# Patient Record
Sex: FEMALE | Race: BLACK OR AFRICAN AMERICAN | Employment: UNEMPLOYED | URBAN - METROPOLITAN AREA
[De-identification: names, ages, dates, MRNs, and addresses within clinical notes are randomized per-mention and may not be internally consistent; named-entity substitution may affect disease eponyms.]

---

## 2019-04-25 ENCOUNTER — HOSPITAL ENCOUNTER (EMERGENCY)
Age: 34
Discharge: HOME | End: 2019-04-25
Payer: MEDICAID

## 2019-04-25 ENCOUNTER — APPOINTMENT (OUTPATIENT)
Dept: GENERAL RADIOLOGY | Age: 34
End: 2019-04-25
Payer: MEDICAID

## 2019-04-25 ENCOUNTER — APPOINTMENT (OUTPATIENT)
Dept: CT IMAGING | Age: 34
End: 2019-04-25
Payer: MEDICAID

## 2019-04-25 VITALS
RESPIRATION RATE: 18 BRPM | TEMPERATURE: 97.6 F | HEART RATE: 92 BPM | WEIGHT: 150 LBS | SYSTOLIC BLOOD PRESSURE: 131 MMHG | HEIGHT: 66 IN | OXYGEN SATURATION: 100 % | BODY MASS INDEX: 24.11 KG/M2 | DIASTOLIC BLOOD PRESSURE: 79 MMHG
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
## 2019-04-25 DIAGNOSIS — R51.9 NONINTRACTABLE HEADACHE, UNSPECIFIED CHRONICITY PATTERN, UNSPECIFIED HEADACHE TYPE: ICD-10-CM

## 2019-04-25 LAB
HCG, URINE, POC: NEGATIVE
Lab: NORMAL
NEGATIVE QC PASS/FAIL: NORMAL
POSITIVE QC PASS/FAIL: NORMAL

## 2019-04-25 PROCEDURE — 99284 EMERGENCY DEPT VISIT MOD MDM: CPT

## 2019-04-25 PROCEDURE — 72125 CT NECK SPINE W/O DYE: CPT

## 2019-04-25 PROCEDURE — 6370000000 HC RX 637 (ALT 250 FOR IP): Performed by: NURSE PRACTITIONER

## 2019-04-25 PROCEDURE — 73130 X-RAY EXAM OF HAND: CPT

## 2019-04-25 PROCEDURE — 70450 CT HEAD/BRAIN W/O DYE: CPT

## 2019-04-25 RX ORDER — IBUPROFEN 800 MG/1
800 TABLET ORAL ONCE
Status: COMPLETED | OUTPATIENT
Start: 2019-04-25 | End: 2019-04-25

## 2019-04-25 RX ORDER — IBUPROFEN 800 MG/1
800 TABLET ORAL EVERY 8 HOURS PRN
Qty: 30 TABLET | Refills: 0 | Status: SHIPPED | OUTPATIENT
Start: 2019-04-25 | End: 2021-04-29

## 2019-04-25 RX ADMIN — IBUPROFEN 800 MG: 800 TABLET, FILM COATED ORAL at 19:25

## 2019-04-25 ASSESSMENT — PAIN DESCRIPTION - LOCATION
LOCATION: HEAD
LOCATION: HEAD

## 2019-04-25 ASSESSMENT — PAIN SCALES - GENERAL
PAINLEVEL_OUTOF10: 9
PAINLEVEL_OUTOF10: 6
PAINLEVEL_OUTOF10: 6

## 2019-04-25 ASSESSMENT — PAIN DESCRIPTION - ORIENTATION: ORIENTATION: RIGHT;LEFT

## 2019-04-25 ASSESSMENT — PAIN DESCRIPTION - DESCRIPTORS
DESCRIPTORS: HEADACHE
DESCRIPTORS: HEADACHE;ACHING

## 2019-04-25 ASSESSMENT — PAIN DESCRIPTION - FREQUENCY: FREQUENCY: CONTINUOUS

## 2019-04-25 ASSESSMENT — PAIN DESCRIPTION - PAIN TYPE
TYPE: ACUTE PAIN
TYPE: ACUTE PAIN

## 2019-04-25 NOTE — ED NOTES
Patient given cup and instructed on needing a urine sample before scans. Patient states she understands.      Charles Clement RN  04/25/19 1931

## 2019-04-25 NOTE — ED NOTES
Bed: 38  Expected date:   Expected time:   Means of arrival:   Comments:  ems     Mary Jane Velazquez RN  04/25/19 [id number]
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
## 2019-11-15 ENCOUNTER — HOSPITAL ENCOUNTER (EMERGENCY)
Age: 34
Discharge: HOME | End: 2019-11-15
Attending: EMERGENCY MEDICINE
Payer: MEDICAID

## 2019-11-15 ENCOUNTER — APPOINTMENT (OUTPATIENT)
Dept: GENERAL RADIOLOGY | Age: 34
End: 2019-11-15
Payer: MEDICAID

## 2019-11-15 VITALS
RESPIRATION RATE: 18 BRPM | BODY MASS INDEX: 25.71 KG/M2 | HEART RATE: 81 BPM | DIASTOLIC BLOOD PRESSURE: 71 MMHG | TEMPERATURE: 98.4 F | WEIGHT: 160 LBS | HEIGHT: 66 IN | SYSTOLIC BLOOD PRESSURE: 106 MMHG | OXYGEN SATURATION: 100 %

## 2019-11-15 DIAGNOSIS — L02.91 ABSCESS: Primary | ICD-10-CM

## 2019-11-15 DIAGNOSIS — M54.9 OTHER ACUTE BACK PAIN: ICD-10-CM

## 2019-11-15 DIAGNOSIS — E11.65 POORLY CONTROLLED DIABETES MELLITUS (HCC): ICD-10-CM

## 2019-11-15 LAB
ANION GAP SERPL CALCULATED.3IONS-SCNC: 13 MMOL/L (ref 7–16)
BACTERIA: ABNORMAL /HPF
BILIRUBIN URINE: NEGATIVE
BLOOD, URINE: NEGATIVE
BUN BLDV-MCNC: 10 MG/DL (ref 6–20)
CALCIUM SERPL-MCNC: 8.8 MG/DL (ref 8.6–10.2)
CHLORIDE BLD-SCNC: 99 MMOL/L (ref 98–107)
CHP ED QC CHECK: YES
CLARITY: CLEAR
CO2: 23 MMOL/L (ref 22–29)
COLOR: YELLOW
CREAT SERPL-MCNC: 0.6 MG/DL (ref 0.5–1)
EPITHELIAL CELLS, UA: ABNORMAL /HPF
GFR AFRICAN AMERICAN: >60
GFR NON-AFRICAN AMERICAN: >60 ML/MIN/1.73
GLUCOSE BLD-MCNC: 165 MG/DL
GLUCOSE BLD-MCNC: 404 MG/DL (ref 74–99)
GLUCOSE URINE: >=1000 MG/DL
HCG(URINE) PREGNANCY TEST: NEGATIVE
HCT VFR BLD CALC: 38.9 % (ref 34–48)
HEMOGLOBIN: 13.2 G/DL (ref 11.5–15.5)
KETONES, URINE: NEGATIVE MG/DL
LEUKOCYTE ESTERASE, URINE: NEGATIVE
MCH RBC QN AUTO: 33.2 PG (ref 26–35)
MCHC RBC AUTO-ENTMCNC: 33.9 % (ref 32–34.5)
MCV RBC AUTO: 98 FL (ref 80–99.9)
METER GLUCOSE: 165 MG/DL (ref 74–99)
NITRITE, URINE: NEGATIVE
PDW BLD-RTO: 11.8 FL (ref 11.5–15)
PH UA: 7 (ref 5–9)
PLATELET # BLD: 240 E9/L (ref 130–450)
PMV BLD AUTO: 9.6 FL (ref 7–12)
POTASSIUM SERPL-SCNC: 4.2 MMOL/L (ref 3.5–5)
PROTEIN UA: NEGATIVE MG/DL
RBC # BLD: 3.97 E12/L (ref 3.5–5.5)
RBC UA: ABNORMAL /HPF (ref 0–2)
SODIUM BLD-SCNC: 135 MMOL/L (ref 132–146)
SPECIFIC GRAVITY UA: <=1.005 (ref 1–1.03)
TROPONIN: <0.01 NG/ML (ref 0–0.03)
UROBILINOGEN, URINE: 0.2 E.U./DL
WBC # BLD: 15.1 E9/L (ref 4.5–11.5)
WBC UA: ABNORMAL /HPF (ref 0–5)

## 2019-11-15 PROCEDURE — 82962 GLUCOSE BLOOD TEST: CPT

## 2019-11-15 PROCEDURE — 81025 URINE PREGNANCY TEST: CPT

## 2019-11-15 PROCEDURE — 2580000003 HC RX 258: Performed by: EMERGENCY MEDICINE

## 2019-11-15 PROCEDURE — 6370000000 HC RX 637 (ALT 250 FOR IP): Performed by: EMERGENCY MEDICINE

## 2019-11-15 PROCEDURE — 96372 THER/PROPH/DIAG INJ SC/IM: CPT

## 2019-11-15 PROCEDURE — 84484 ASSAY OF TROPONIN QUANT: CPT

## 2019-11-15 PROCEDURE — 80048 BASIC METABOLIC PNL TOTAL CA: CPT

## 2019-11-15 PROCEDURE — 6360000002 HC RX W HCPCS: Performed by: EMERGENCY MEDICINE

## 2019-11-15 PROCEDURE — 93005 ELECTROCARDIOGRAM TRACING: CPT | Performed by: PHYSICIAN ASSISTANT

## 2019-11-15 PROCEDURE — 36415 COLL VENOUS BLD VENIPUNCTURE: CPT

## 2019-11-15 PROCEDURE — 81001 URINALYSIS AUTO W/SCOPE: CPT

## 2019-11-15 PROCEDURE — 96374 THER/PROPH/DIAG INJ IV PUSH: CPT

## 2019-11-15 PROCEDURE — 71046 X-RAY EXAM CHEST 2 VIEWS: CPT

## 2019-11-15 PROCEDURE — 99283 EMERGENCY DEPT VISIT LOW MDM: CPT

## 2019-11-15 PROCEDURE — 85027 COMPLETE CBC AUTOMATED: CPT

## 2019-11-15 RX ORDER — KETOROLAC TROMETHAMINE 30 MG/ML
30 INJECTION, SOLUTION INTRAMUSCULAR; INTRAVENOUS ONCE
Status: COMPLETED | OUTPATIENT
Start: 2019-11-15 | End: 2019-11-15

## 2019-11-15 RX ORDER — ASPIRIN 81 MG/1
260 TABLET, CHEWABLE ORAL ONCE
Status: COMPLETED | OUTPATIENT
Start: 2019-11-15 | End: 2019-11-15

## 2019-11-15 RX ORDER — 0.9 % SODIUM CHLORIDE 0.9 %
500 INTRAVENOUS SOLUTION INTRAVENOUS ONCE
Status: COMPLETED | OUTPATIENT
Start: 2019-11-15 | End: 2019-11-15

## 2019-11-15 RX ORDER — CEPHALEXIN 500 MG/1
500 CAPSULE ORAL ONCE
Status: COMPLETED | OUTPATIENT
Start: 2019-11-15 | End: 2019-11-15

## 2019-11-15 RX ORDER — SULFAMETHOXAZOLE AND TRIMETHOPRIM 800; 160 MG/1; MG/1
1 TABLET ORAL ONCE
Status: COMPLETED | OUTPATIENT
Start: 2019-11-15 | End: 2019-11-15

## 2019-11-15 RX ORDER — SULFAMETHOXAZOLE AND TRIMETHOPRIM 800; 160 MG/1; MG/1
1 TABLET ORAL 2 TIMES DAILY
Qty: 20 TABLET | Refills: 0 | Status: SHIPPED | OUTPATIENT
Start: 2019-11-15 | End: 2019-11-25

## 2019-11-15 RX ORDER — CEPHALEXIN 500 MG/1
500 CAPSULE ORAL 4 TIMES DAILY
Qty: 28 CAPSULE | Refills: 0 | Status: SHIPPED | OUTPATIENT
Start: 2019-11-15 | End: 2019-11-22

## 2019-11-15 RX ADMIN — KETOROLAC TROMETHAMINE 30 MG: 30 INJECTION, SOLUTION INTRAMUSCULAR; INTRAVENOUS at 21:45

## 2019-11-15 RX ADMIN — SULFAMETHOXAZOLE AND TRIMETHOPRIM 1 TABLET: 800; 160 TABLET ORAL at 21:41

## 2019-11-15 RX ADMIN — SODIUM CHLORIDE 500 ML: 9 INJECTION, SOLUTION INTRAVENOUS at 21:44

## 2019-11-15 RX ADMIN — CEPHALEXIN 500 MG: 500 CAPSULE ORAL at 21:41

## 2019-11-15 RX ADMIN — ASPIRIN 81 MG 243 MG: 81 TABLET ORAL at 20:33

## 2019-11-15 RX ADMIN — INSULIN HUMAN 7 UNITS: 100 INJECTION, SOLUTION PARENTERAL at 21:45

## 2019-11-15 ASSESSMENT — ENCOUNTER SYMPTOMS
SINUS PRESSURE: 0
EYE PAIN: 0
VOMITING: 0
ABDOMINAL DISTENTION: 0
WHEEZING: 0
NAUSEA: 0
COUGH: 0
SHORTNESS OF BREATH: 0
EYE DISCHARGE: 0
EYE REDNESS: 0
SORE THROAT: 0
BACK PAIN: 1
DIARRHEA: 0
ABDOMINAL PAIN: 0

## 2019-11-15 ASSESSMENT — PAIN SCALES - GENERAL
PAINLEVEL_OUTOF10: 8
PAINLEVEL_OUTOF10: 8

## 2019-11-15 ASSESSMENT — PAIN DESCRIPTION - PAIN TYPE: TYPE: ACUTE PAIN

## 2019-11-15 ASSESSMENT — PAIN DESCRIPTION - LOCATION: LOCATION: BACK

## 2019-11-15 ASSESSMENT — PAIN DESCRIPTION - DESCRIPTORS: DESCRIPTORS: THROBBING

## 2019-11-15 ASSESSMENT — PAIN DESCRIPTION - FREQUENCY: FREQUENCY: CONTINUOUS

## 2019-11-16 LAB
EKG ATRIAL RATE: 76 BPM
EKG P AXIS: 51 DEGREES
EKG P-R INTERVAL: 136 MS
EKG Q-T INTERVAL: 374 MS
EKG QRS DURATION: 82 MS
EKG QTC CALCULATION (BAZETT): 420 MS
EKG R AXIS: 33 DEGREES
EKG T AXIS: 33 DEGREES
EKG VENTRICULAR RATE: 76 BPM

## 2019-11-16 PROCEDURE — 93010 ELECTROCARDIOGRAM REPORT: CPT | Performed by: INTERNAL MEDICINE

## 2021-04-29 ENCOUNTER — HOSPITAL ENCOUNTER (EMERGENCY)
Age: 36
Discharge: HOME | End: 2021-04-29
Payer: MEDICAID

## 2021-04-29 VITALS
TEMPERATURE: 96.9 F | BODY MASS INDEX: 25.71 KG/M2 | OXYGEN SATURATION: 94 % | SYSTOLIC BLOOD PRESSURE: 145 MMHG | HEIGHT: 66 IN | WEIGHT: 160 LBS | HEART RATE: 83 BPM | DIASTOLIC BLOOD PRESSURE: 84 MMHG | RESPIRATION RATE: 16 BRPM

## 2021-04-29 DIAGNOSIS — N76.4 LEFT GENITAL LABIAL ABSCESS: Primary | ICD-10-CM

## 2021-04-29 LAB
CHP ED QC CHECK: NORMAL
GLUCOSE BLD-MCNC: 201 MG/DL
HCG, URINE, POC: NEGATIVE
Lab: NORMAL
METER GLUCOSE: 201 MG/DL (ref 74–99)
NEGATIVE QC PASS/FAIL: NORMAL
POSITIVE QC PASS/FAIL: NORMAL

## 2021-04-29 PROCEDURE — 2500000003 HC RX 250 WO HCPCS: Performed by: NURSE PRACTITIONER

## 2021-04-29 PROCEDURE — 99283 EMERGENCY DEPT VISIT LOW MDM: CPT

## 2021-04-29 PROCEDURE — 82962 GLUCOSE BLOOD TEST: CPT

## 2021-04-29 PROCEDURE — 6370000000 HC RX 637 (ALT 250 FOR IP): Performed by: NURSE PRACTITIONER

## 2021-04-29 PROCEDURE — 56405 I&D VULVA/PERINEAL ABSCESS: CPT

## 2021-04-29 RX ORDER — IBUPROFEN 800 MG/1
800 TABLET ORAL EVERY 8 HOURS PRN
Qty: 30 TABLET | Refills: 0 | Status: SHIPPED | OUTPATIENT
Start: 2021-04-29

## 2021-04-29 RX ORDER — LIDOCAINE HYDROCHLORIDE AND EPINEPHRINE 10; 10 MG/ML; UG/ML
20 INJECTION, SOLUTION INFILTRATION; PERINEURAL ONCE
Status: COMPLETED | OUTPATIENT
Start: 2021-04-29 | End: 2021-04-29

## 2021-04-29 RX ORDER — OXYCODONE HYDROCHLORIDE AND ACETAMINOPHEN 5; 325 MG/1; MG/1
1 TABLET ORAL ONCE
Status: COMPLETED | OUTPATIENT
Start: 2021-04-29 | End: 2021-04-29

## 2021-04-29 RX ORDER — DOXYCYCLINE HYCLATE 100 MG
100 TABLET ORAL 2 TIMES DAILY
Qty: 14 TABLET | Refills: 0 | Status: SHIPPED | OUTPATIENT
Start: 2021-04-29 | End: 2021-05-06

## 2021-04-29 RX ADMIN — OXYCODONE AND ACETAMINOPHEN 1 TABLET: 5; 325 TABLET ORAL at 09:41

## 2021-04-29 RX ADMIN — LIDOCAINE HYDROCHLORIDE AND EPINEPHRINE 20 ML: 10; 10 INJECTION, SOLUTION INFILTRATION; PERINEURAL at 09:41

## 2021-04-29 ASSESSMENT — PAIN DESCRIPTION - PROGRESSION: CLINICAL_PROGRESSION: GRADUALLY WORSENING

## 2021-04-29 ASSESSMENT — PAIN SCALES - GENERAL
PAINLEVEL_OUTOF10: 9
PAINLEVEL_OUTOF10: 10

## 2021-04-29 ASSESSMENT — PAIN DESCRIPTION - FREQUENCY: FREQUENCY: CONTINUOUS

## 2021-04-29 ASSESSMENT — PAIN DESCRIPTION - ONSET: ONSET: PROGRESSIVE

## 2021-04-29 ASSESSMENT — PAIN DESCRIPTION - PAIN TYPE: TYPE: ACUTE PAIN

## 2021-04-29 ASSESSMENT — PAIN DESCRIPTION - LOCATION: LOCATION: LABIA

## 2021-04-29 NOTE — ED PROVIDER NOTES
lb (72.6 kg)         Constitutional:  Alert, development consistent with age. HEENT:  NC/NT. Airway patent  Neck:  Normal ROM. Supple. Respiratory:  Clear to auscultation and breath sounds equal.  CV:  Regular rate and rhythm, normal heart sounds, without pathological murmurs, ectopy, gallops, or rubs. GI:  Abdomen Soft, nontender, good bowel sounds. No firm or pulsatile mass. Back:  No costovertebral tenderness. Extremities: No tenderness or edema noted. Integument: Approximately 2 cm indurated fluctuant abscess to left inferior labial fold. No surrounding erythema warmth, no spontaneous drainage normal turgor. Warm, dry, without visible rash, unless noted elsewhere. Lymphatics: No lymphangitis or adenopathy noted. Neurological:  Oriented. Motor functions intact. Lab / Imaging Results   (All laboratory and radiology results have been personally reviewed by myself)  Labs:  Results for orders placed or performed during the hospital encounter of 04/29/21   POCT glucose   Result Value Ref Range    Glucose 201 mg/dL    QC OK? ok    POC Pregnancy Urine Qual   Result Value Ref Range    HCG, Urine, POC Negative Negative    Lot Number [id number]     Positive QC Pass/Fail Pass     Negative QC Pass/Fail Pass    POCT Glucose   Result Value Ref Range    Meter Glucose 201 (H) 74 - 99 mg/dL     Imaging: All Radiology results interpreted by Radiologist unless otherwise noted. No orders to display       ED Course / Medical Decision Making     Medications   oxyCODONE-acetaminophen (PERCOCET) 5-325 MG per tablet 1 tablet (1 tablet Oral Given 4/29/21 0941)   lidocaine-EPINEPHrine 1 %-1:611535 injection 20 mL (20 mLs Intradermal Given 4/29/21 0941)          Consult(s):   None    Procedure(s):     PROCEDURE  4/29/21       Time:  0945    INCISION AND DRAINAGE  Risks, benefits and alternatives (for applicable procedures below) described. Performed By: JADON Valle - CNP.     Indication: Abscess  Informed consent

## 2021-11-09 ENCOUNTER — HOSPITAL ENCOUNTER (EMERGENCY)
Age: 36
Discharge: HOME | End: 2021-11-09
Payer: MEDICAID

## 2021-11-09 ENCOUNTER — APPOINTMENT (OUTPATIENT)
Dept: GENERAL RADIOLOGY | Age: 36
End: 2021-11-09
Payer: MEDICAID

## 2021-11-09 VITALS
OXYGEN SATURATION: 98 % | HEIGHT: 66 IN | HEART RATE: 79 BPM | BODY MASS INDEX: 24.11 KG/M2 | TEMPERATURE: 98 F | WEIGHT: 150 LBS

## 2021-11-09 DIAGNOSIS — S69.92XA FINGER INJURY, LEFT, INITIAL ENCOUNTER: Primary | ICD-10-CM

## 2021-11-09 PROCEDURE — 73130 X-RAY EXAM OF HAND: CPT

## 2021-11-09 PROCEDURE — 99283 EMERGENCY DEPT VISIT LOW MDM: CPT

## 2021-11-09 RX ORDER — ASPIRIN 81 MG/1
81 TABLET, CHEWABLE ORAL DAILY
COMMUNITY

## 2021-11-09 RX ORDER — NAPROXEN 500 MG/1
500 TABLET ORAL 2 TIMES DAILY
Qty: 20 TABLET | Refills: 0 | Status: SHIPPED | OUTPATIENT
Start: 2021-11-09 | End: 2021-11-19

## 2021-11-09 RX ORDER — LISINOPRIL 10 MG/1
10 TABLET ORAL DAILY
COMMUNITY

## 2021-11-09 RX ORDER — ATORVASTATIN CALCIUM 20 MG/1
20 TABLET, FILM COATED ORAL DAILY
COMMUNITY

## 2021-11-09 ASSESSMENT — PAIN DESCRIPTION - PAIN TYPE: TYPE: ACUTE PAIN

## 2021-11-09 ASSESSMENT — PAIN DESCRIPTION - ORIENTATION: ORIENTATION: LEFT

## 2021-11-09 ASSESSMENT — PAIN DESCRIPTION - LOCATION: LOCATION: FINGER (COMMENT WHICH ONE)

## 2021-11-09 ASSESSMENT — PAIN SCALES - GENERAL: PAINLEVEL_OUTOF10: 6

## 2021-11-09 ASSESSMENT — PAIN DESCRIPTION - DESCRIPTORS: DESCRIPTORS: BURNING

## 2021-11-09 NOTE — Clinical Note
Mendel Garwin was seen and treated in our emergency department on 11/9/2021. She may return to work on 11/10/2021. If you have any questions or concerns, please don't hesitate to call.       Winnie Cullen PA-C

## 2021-11-09 NOTE — ED PROVIDER NOTES
[address]  Department of Emergency Medicine   ED  Encounter Note  Admit Date/RoomTime: 2021  8:44 AM  ED Room: ST/ST-2    NAME: Pham Jewell  : 1985  MRN: [id number]     Chief Complaint:  Finger Pain (left index finger pain that started a week ago after dropping pan on it.)    History of Present Illness        Pham Jewell is a 29 y.o. old female presenting to the emergency department by private vehicle, for traumatic Left Index Finger and Middle Finger pain which occured 1 week(s) prior to arrival.  The complaint is due to a crush injury while at home. Patient states \"I was cooking and I dropped a hand on it and since then I had pain. \"  Patient states she tried Tylenol and ibuprofen which helped take the edge off. Patient states any movement makes it worse. Patient states she always has a \"burning nerve pain. \"  Since onset the symptoms have been persistent. Patient has no prior history of pain/injury with regards to today's visit. Her pain is aggraveated by any movement and relieved by OTC NSAIDS. She is right handed. Tetanus Status: up to date. ROS   Pertinent positives and negatives are stated within HPI, all other systems reviewed and are negative. Past Medical History:  has a past medical history of Abnormal Pap smear, Asthma, Diabetes mellitus ([address] Diabetic neuropathy ([address] Genital herpes, and GERD (gastroesophageal reflux disease). Surgical History:  has a past surgical history that includes  section; Foot surgery; and Dilation & curettage (). Social History:  reports that she has been smoking. She has a 5.00 pack-year smoking history. She has never used smokeless tobacco. She reports that she does not drink alcohol and does not use drugs. Family History: family history is not on file.      Allergies: Cardec dm [phenylephrine-chlorphen-dm]    Physical Exam   Oxygen Saturation Interpretation: Normal.        ED Triage Vitals   BP Temp Temp Source Pulse Resp SpO2 Height Weight   -- 11/09/21 0820 11/09/21 0838 11/09/21 0820 -- 11/09/21 0820 11/09/21 0838 11/09/21 0838    98 °F (36.7 °C) Oral 75  98 % 5' 6\" (1.676 m) 150 lb (68 kg)         Constitutional:  Alert, development consistent with age. Neck:  Normal ROM. Supple. Non-tender. Fingers:  Left Index finger PIP joint dorsal and proximal aspect and Middle finger PIP joint dorsal and proximal aspect            Tenderness:  mild. Swelling: None. Deformity: no deformity observed/palpated. ROM: full range with pain. Skin:  no wounds, erythema, or swelling. Patient has good radial pulse. Patient has full flexion extension       Neurovascular: Motor deficit: none. Sensory deficit:   none. Pulse deficit: none. Capillary refill: normal.  Left Hand: all metacarpals. Tenderness: none. Swelling: None. Deformity: no.             Skin:  no wounds, erythema, or swelling. Left Wrist:               Tenderness:  none. Swelling: None. Deformity: no deformity observed/palpated. ROM: full range of motion. Skin:  no wounds, erythema, or swelling. Lymphatics: No lymphangitis or adenopathy noted. Neurological:  Oriented. Motor functions intact. t. Lab / Imaging Results   (All laboratory and radiology results have been personally reviewed by myself)  Labs:  No results found for this visit on 11/09/21. Imaging: All Radiology results interpreted by Radiologist unless otherwise noted. XR HAND LEFT (MIN 3 VIEWS)   Final Result   1. There is no fracture dislocation of the left hand   2.  Mild degenerative changes of the PIP joint of the left 3rd digit             ED Course / Medical Decision Making   Medications - No data to display     Consult(s):   None    Procedure(s):  None    MDM:     Patient is a 28-year-old female that presented to the emergency department with left index and middle finger pain after dropping a frying pan. Patient had a full examination and can fully flex and extend. Good radial pulse. Good cap refill. No ecchymosis or deformity. Imaging was obtained based on low suspicion for fracture / bony abnormality, dislocation, joint effusion as per history/physical findings. No fracture or dislocation was noted. Patient was educated she most likely has a bone contusion. Patient was told to continue with naproxen alternating with Tylenol every 6-8 hours with food. Patient was explained that this pain can last for a few weeks and she should use ice 20 minutes on 20 minutes off. Patient was told to follow-up with her family doctor. Patient has no evidence of a septic joint. Patient is vitally stable and ready for outpatient follow-up. Plan is subsequently for symptom control, limited use and  immobilization with appropriate outpatient follow-up. Patient was explicitly instructed on specific signs and symptoms on which to return to the emergency room for. Patient was instructed to return to the ER for any new or worsening symptoms. Additional discharge instructions were given verbally. All questions were answered. Patient is comfortable and agreeable with discharge plan. Patient in no acute distress and non-toxic in appearance. Plan of Care/Counseling:  Angel Luis Parr PA-C reviewed today's visit with the patient in addition to providing specific details for the plan of care and counseling regarding the diagnosis and prognosis. Questions are answered at this time and are agreeable with the plan. Assessment      1. Finger injury, left, initial encounter      Plan   Discharged home.   Patient condition is stable    New Medications     New Prescriptions    NAPROXEN (NAPROSYN) 500 MG TABLET    Take 1 tablet by mouth 2 times daily for 10 days     Electronically signed by Shavonne Ridley SOILA Dean   DD: 11/9/21  **This report was transcribed using voice recognition software. Every effort was made to ensure accuracy; however, inadvertent computerized transcription errors may be present.   END OF ED PROVIDER NOTE       Nadya Irene PA-C  11/09/21 [id number] Domenica Luciano PA-C  11/09/21 [id number]

## 2022-09-01 ENCOUNTER — APPOINTMENT (OUTPATIENT)
Dept: GENERAL RADIOLOGY | Age: 37
End: 2022-09-01
Payer: MEDICAID

## 2022-09-01 ENCOUNTER — HOSPITAL ENCOUNTER (EMERGENCY)
Age: 37
Discharge: HOME | End: 2022-09-01
Payer: MEDICAID

## 2022-09-01 VITALS
DIASTOLIC BLOOD PRESSURE: 103 MMHG | OXYGEN SATURATION: 99 % | SYSTOLIC BLOOD PRESSURE: 124 MMHG | RESPIRATION RATE: 18 BRPM | TEMPERATURE: 97.9 F | HEART RATE: 78 BPM

## 2022-09-01 DIAGNOSIS — M54.32 SCIATICA OF LEFT SIDE: ICD-10-CM

## 2022-09-01 DIAGNOSIS — N93.9 VAGINAL SPOTTING: Primary | ICD-10-CM

## 2022-09-01 DIAGNOSIS — S39.012A STRAIN OF LUMBAR REGION, INITIAL ENCOUNTER: ICD-10-CM

## 2022-09-01 DIAGNOSIS — Z11.3 SCREEN FOR STD (SEXUALLY TRANSMITTED DISEASE): ICD-10-CM

## 2022-09-01 LAB
BACTERIA: ABNORMAL /HPF
BILIRUBIN URINE: NEGATIVE
BLOOD, URINE: NEGATIVE
CHP ED QC CHECK: NORMAL
CHP ED QC CHECK: NORMAL
CLARITY: CLEAR
CLUE CELLS: NORMAL
COLOR: YELLOW
GLUCOSE BLD-MCNC: 137 MG/DL
GLUCOSE BLD-MCNC: 68 MG/DL
GLUCOSE URINE: >=1000 MG/DL
HCG(URINE) PREGNANCY TEST: NEGATIVE
KETONES, URINE: NEGATIVE MG/DL
LEUKOCYTE ESTERASE, URINE: ABNORMAL
METER GLUCOSE: 137 MG/DL (ref 74–99)
METER GLUCOSE: 68 MG/DL (ref 74–99)
NITRITE, URINE: NEGATIVE
PH UA: 6 (ref 5–9)
PROTEIN UA: NEGATIVE MG/DL
RBC UA: ABNORMAL /HPF (ref 0–2)
SOURCE WET PREP: NORMAL
SPECIFIC GRAVITY UA: <=1.005 (ref 1–1.03)
TRICHOMONAS PREP: NORMAL
UROBILINOGEN, URINE: 0.2 E.U./DL
WBC UA: ABNORMAL /HPF (ref 0–5)
YEAST WET PREP: NORMAL

## 2022-09-01 PROCEDURE — 87591 N.GONORRHOEAE DNA AMP PROB: CPT

## 2022-09-01 PROCEDURE — 72100 X-RAY EXAM L-S SPINE 2/3 VWS: CPT

## 2022-09-01 PROCEDURE — 87491 CHLMYD TRACH DNA AMP PROBE: CPT

## 2022-09-01 PROCEDURE — 99284 EMERGENCY DEPT VISIT MOD MDM: CPT

## 2022-09-01 PROCEDURE — 82962 GLUCOSE BLOOD TEST: CPT

## 2022-09-01 PROCEDURE — 6360000002 HC RX W HCPCS: Performed by: PHYSICIAN ASSISTANT

## 2022-09-01 PROCEDURE — 87210 SMEAR WET MOUNT SALINE/INK: CPT

## 2022-09-01 PROCEDURE — 81001 URINALYSIS AUTO W/SCOPE: CPT

## 2022-09-01 PROCEDURE — 81025 URINE PREGNANCY TEST: CPT

## 2022-09-01 PROCEDURE — 96372 THER/PROPH/DIAG INJ SC/IM: CPT

## 2022-09-01 RX ORDER — KETOROLAC TROMETHAMINE 30 MG/ML
30 INJECTION, SOLUTION INTRAMUSCULAR; INTRAVENOUS ONCE
Status: COMPLETED | OUTPATIENT
Start: 2022-09-01 | End: 2022-09-01

## 2022-09-01 RX ORDER — IBUPROFEN 800 MG/1
800 TABLET ORAL 2 TIMES DAILY PRN
Qty: 20 TABLET | Refills: 0 | Status: SHIPPED | OUTPATIENT
Start: 2022-09-01 | End: 2022-09-11

## 2022-09-01 RX ADMIN — KETOROLAC TROMETHAMINE 30 MG: 30 INJECTION, SOLUTION INTRAMUSCULAR at 11:19

## 2022-09-01 NOTE — ED PROVIDER NOTES
[address]  Department of Emergency Medicine   ED  Encounter Note  Admit Date/RoomTime: 2022  9:02 AM  ED Room: 3232    NAME: Sarah Cárdenas  : 1985  MRN: [id number]     Chief Complaint:  Back Pain (Hx sciatica, here for back pain right side that travels to her right leg reports some numbness and tingling in right leg denies CP, SOB, DENIES LOSS OF bLADDER AND BOWEL FUNCTION, Last BM was this AM) and Vaginal Bleeding (States she has had some abnormal spotting cramping, and foul smelling discharge wants checked out)    History of Present Illness       Sarah Cárdenas is a 40 y.o. old female who presents to the emergency department by private vehicle for vaginal discharge:  Odor, intermittent spotting after ending her menstrual cycle , which occured 4 day(s) prior to arrival.  Since onset the symptoms have been persistent and mild in severity. Symptoms are associated with no additional symptoms and denies any abdominal pain, chest pain, shortness of breath, fever, chills, sweating, nausea, vomiting, anorexia, weight loss, diarrhea, constipation, dark/black stools, blood in stool, blood in emesis, cloudy urine, urinary frequency, dysuria, hematuria, urinary urgency, or urinary incontinence. She takes no blood thinning agents. Patient's last menstrual period was 2022. . [id number]. GYN/STD Hx: No prior history of sexually transmitted disease. Patient states she is a controlled diabetic. As a secondary complaint, patient states that she chronically gets sciatica. Patient states she stands all day at her job and about once a year she gets the sciatica pain noted on her left lower back that radiates into her left thigh. Patient states it started occurring the last few days. Patient states \"I usually get a shot and feel much better and only have to come back once a year. \"  Patient states she has no loss of bowel or bladder function, no genital numbness or tingling or paresthesias. Patient is still ambulatory. Patient denies any radiation of any pain. Patient states is only on the left side into her left thigh. No radiation below the knee. Patient states nothing seems to make it better and standing makes it worse. Patient denies any recent falls or trauma. Patient currently denying any headaches, blurry vision, chest pain, shortness of breath, fever, chills, nausea, vomiting, severe abdominal pain, change in bowel or bladder movements. ROS   Pertinent positives and negatives are stated within HPI, all other systems reviewed and are negative. Past Medical History:  has a past medical history of Abnormal Pap smear, Asthma, Diabetes mellitus ([address] Diabetic neuropathy ([facility] [address] Genital herpes, and GERD (gastroesophageal reflux disease). Surgical History:  has a past surgical history that includes  section; Foot surgery; and Dilation & curettage (). Social History:  reports that she has been smoking. She has a 5.00 pack-year smoking history. She has never used smokeless tobacco. She reports that she does not drink alcohol and does not use drugs. Family History: family history is not on file. Allergies: Cardec dm [phenylephrine-chlorphen-dm]    Physical Exam   Oxygen Saturation Interpretation: Normal.        ED Triage Vitals   BP Temp Temp Source Heart Rate Resp SpO2 Height Weight   22 0859 22 0846 22 0846 22 0846 22 0859 22 0846 -- --   (!) 124/103 97.9 °F (36.6 °C) Oral 78 18 99 %           General Appearance/Constitutional:  Alert, development consistent with age, NAD. HEENT:  NC/NT. PERRLA. Airway patent. Neck:  Supple. No lymphadenopathy. Respiratory:  Clear to auscultation and breath sounds equal.  CV:  Regular rate and rhythm. GI:  normal appearing, non-distended with no visible hernias. Bowel sounds: normal bowel sounds.            Tenderness: No abdominal tenderness, guarding, rebound, rigidity or pulsatile mass. .        Liver: non-tender. Spleen:  non-tender. Back: CVA Tenderness: No CVA tenderness. Entire spine palpated including cervical, thoracic and lumbar. No midline tenderness exhibited. Only finding is left paraspinal tenderness into the left acetabular area and the left [place] wrapping around to the left mid thigh. Patient's bilateral straight leg raise is negative. Patient has equal strength including flexion and extension in lower extremities. Patient has good DP/PT pulses. Good colorization, no pitting edema. Negative Homans' sign bilaterally. : Pelvic Exam: (Same sex / third party chaperone present during examination). External Genitalia: General appearance; normal, Hair distribution; normal, Lesions absent. Urethral Meatus: Size normal, Location normal, Lesions absent, Prolapse absent. Vagina: no abnormal discharge or lesions. Cervix: normal appearing cervix without discharge or lesions. Uterus:  normal size, contour, position, consistency, mobility, non-tender. Adenexa: no tenderness bilaterally. Anus/Perineum:  normal.  Integument:  Normal turgor. Warm, dry, without visible rash, unless noted elsewhere. Lymphatics: No lymphangitis or adenopathy noted. Neurological:  Orientation age-appropriate. Motor functions intact.     Lab / Imaging Results   (All laboratory and radiology results have been personally reviewed by myself)  Labs:  Results for orders placed or performed during the hospital encounter of 09/01/22   Wet prep, genital    Specimen: Vaginal   Result Value Ref Range    Trichomonas Prep None Seen     Yeast, Wet Prep None Seen     Clue Cells, Wet Prep None Seen     Source Wet Prep VAGINAL    C.trachomatis N.gonorrhoeae DNA    Specimen: Cervix   Result Value Ref Range    Source Vagina    Urinalysis with Microscopic   Result Value Ref Range    Color, UA Yellow Straw/Yellow    Clarity, UA Clear Clear    Glucose, Ur >=1000 (A) Negative mg/dL    Bilirubin Urine Negative Negative    Ketones, Urine Negative Negative mg/dL    Specific Gravity, UA <=1.005 1.005 - 1.030    Blood, Urine Negative Negative    pH, UA 6.0 5.0 - 9.0    Protein, UA Negative Negative mg/dL    Urobilinogen, Urine 0.2 <2.0 E.U./dL    Nitrite, Urine Negative Negative    Leukocyte Esterase, Urine TRACE (A) Negative    WBC, UA 1-3 0 - 5 /HPF    RBC, UA NONE 0 - 2 /HPF    Bacteria, UA NONE SEEN None Seen /HPF   Pregnancy, urine   Result Value Ref Range    HCG(Urine) Pregnancy Test NEGATIVE NEGATIVE   POCT Glucose   Result Value Ref Range    Meter Glucose 68 (L) 74 - 99 mg/dL     Imaging: All Radiology results interpreted by Radiologist unless otherwise noted. XR LUMBAR SPINE (2-3 VIEWS)   Final Result   1. Mild degenerative disc disease at L1-2 and L2-3.      2.  Minimal lower lumbar spine facet arthropathy. 3.  No acute fracture or subluxation. ED Course / Medical Decision Making     Medications   ketorolac (TORADOL) injection 30 mg (has no administration in time range)        Re-examination:  9/1/22       Time: Patient's glucose was found to be greater than 8000 therefore POC glucose was done which read 68 therefore patient was fed. Patient was educated that findings are negative for any type of infection. Patient declined gonorrhea and chlamydia treatment and would like to wait for results. Patient states that she thought she just had an \"odor. \"  Patient would like to refrain from treatment then and follow-up with her gynecologist.  Patient states that she is a insulin-dependent diabetic and states that her numbers have not been controlled with her last A1c at 11. Patient states she is going to be getting a Dexcom to monitor her sugars much more closely. Patient at this time is vitally stable. Patient was given Toradol for her discomfort and x-ray imaging was gone over in detail.   Patient voiced understanding and agreed    Consults:   None    Procedures:   none    MDM:   Patient is a 50-year-old female that is presenting with left-sided sciatica as well as vaginal discharge for the last few days. Patient denies any falls or trauma. Patient denies any new antibiotics or changes in diet. Patient had a thorough examination and minimal to no discharge noted in the vaginal vault. Cultures taken. Patient had a urinalysis as well as a POC. Patient also had a lumbar x-ray due to the fact that she has not been imaged and had this chronic sciatica for a few years that comes and goes. Patient's x-ray imaging was found to be negative for any acute fracture or dislocation. Urinalysis was only positive for is minimal amount of leukocytes but no bacteria. Glucose was over thousand therefore POC glucose was done and was found to be 68. Patient was fed. Patient is completely asymptomatic. Patient is a insulin controlled diabetic at 11 A1c last read. Patient states that they are working on getting her a Dexcom. Patient is vitally stable. Patient was given Toradol and states that she is feeling much better. Patient declined STD treatment at this time. Patient will follow-up with her gynecologist for further evaluation. Patient currently denying any headaches, blurry vision, chest pain, shortness of breath, nausea, vomiting, abdominal pain, change in bowel or bladder movements. Patient is vitally stable and ready for outpatient. Patient was explicitly instructed on specific signs and symptoms on which to return to the emergency room for. Patient was instructed to return to the ER for any new or worsening symptoms. Additional discharge instructions were given verbally. All questions were answered. Patient is comfortable and agreeable with discharge plan. Patient in no acute distress and non-toxic in appearance.        Plan of Care/Counseling:  Stephanie Phillip PA-C reviewed today's visit with the patient in addition to providing specific details for the plan of care and counseling regarding the diagnosis and prognosis. Questions are answered at this time and are agreeable with the plan. Assessment      1. Vaginal spotting    2. Sciatica of left side    3. Strain of lumbar region, initial encounter    4. Screen for STD (sexually transmitted disease)      Plan   Discharged home. Patient condition is stable    New Medications     New Prescriptions    IBUPROFEN (ADVIL;MOTRIN) 800 MG TABLET    Take 1 tablet by mouth 2 times daily as needed for Pain     Electronically signed by Clem Hull PA-C   DD: 9/1/22  **This report was transcribed using voice recognition software. Every effort was made to ensure accuracy; however, inadvertent computerized transcription errors may be present.   END OF ED PROVIDER NOTE      Clem Hull PA-C  09/01/22 [id number]

## 2022-09-01 NOTE — Clinical Note
Leslie Calle was seen and treated in our emergency department on 9/1/2022. She may return to work on 09/03/2022. If you have any questions or concerns, please don't hesitate to call.       Dat Worthy PA-C

## 2022-09-06 LAB
C TRACH DNA GENITAL QL NAA+PROBE: NEGATIVE
N. GONORRHOEAE DNA: NEGATIVE
SOURCE: NORMAL

## 2025-05-29 ENCOUNTER — OFFICE VISIT (OUTPATIENT)
Dept: ENDOCRINOLOGY | Age: 40
End: 2025-05-29
Payer: MEDICAID

## 2025-05-29 VITALS
WEIGHT: 166 LBS | HEIGHT: 66 IN | RESPIRATION RATE: 18 BRPM | HEART RATE: 66 BPM | OXYGEN SATURATION: 99 % | DIASTOLIC BLOOD PRESSURE: 85 MMHG | BODY MASS INDEX: 26.68 KG/M2 | SYSTOLIC BLOOD PRESSURE: 128 MMHG | TEMPERATURE: 97.7 F

## 2025-05-29 DIAGNOSIS — E11.65 POORLY CONTROLLED DIABETES MELLITUS (HCC): Primary | ICD-10-CM

## 2025-05-29 DIAGNOSIS — Z91.119 DIETARY NONCOMPLIANCE: ICD-10-CM

## 2025-05-29 LAB — HBA1C MFR BLD: 9.2 %

## 2025-05-29 PROCEDURE — 3046F HEMOGLOBIN A1C LEVEL >9.0%: CPT | Performed by: NURSE PRACTITIONER

## 2025-05-29 PROCEDURE — 4004F PT TOBACCO SCREEN RCVD TLK: CPT | Performed by: NURSE PRACTITIONER

## 2025-05-29 PROCEDURE — 99205 OFFICE O/P NEW HI 60 MIN: CPT | Performed by: NURSE PRACTITIONER

## 2025-05-29 PROCEDURE — G8419 CALC BMI OUT NRM PARAM NOF/U: HCPCS | Performed by: NURSE PRACTITIONER

## 2025-05-29 PROCEDURE — 95251 CONT GLUC MNTR ANALYSIS I&R: CPT | Performed by: NURSE PRACTITIONER

## 2025-05-29 PROCEDURE — 2022F DILAT RTA XM EVC RTNOPTHY: CPT | Performed by: NURSE PRACTITIONER

## 2025-05-29 PROCEDURE — 83036 HEMOGLOBIN GLYCOSYLATED A1C: CPT | Performed by: NURSE PRACTITIONER

## 2025-05-29 PROCEDURE — G8427 DOCREV CUR MEDS BY ELIG CLIN: HCPCS | Performed by: NURSE PRACTITIONER

## 2025-05-29 RX ORDER — INSULIN LISPRO 100 [IU]/ML
INJECTION, SOLUTION INTRAVENOUS; SUBCUTANEOUS
Qty: 10 ML | Refills: 3 | Status: SHIPPED | OUTPATIENT
Start: 2025-05-29

## 2025-05-29 NOTE — PATIENT INSTRUCTIONS
Lantus 44 units  Lispro 1 units for every  10 carbs and the add scale at meals   2 hours after eating you can use the scale to lower BS  > 200

## 2025-05-29 NOTE — PROGRESS NOTES
[facility]  [facility] Department of Endocrinology Diabetes and Metabolism   [address], [place] [address]  Phone: [phone]  Fax: [phone]    Date of Service: 5/29/2025    Primary Care Physician: Higinio Ruelas DO  Referring physician: Joy Jeffery PA-C  Provider: JADON Marin NP     Reason for the visit:    New pt referral, Type 1 DM     History of Present Illness:  The history is provided by the patient. No  was used. Accuracy of the patient data is excellent.  Aletha Jane is a very pleasant 40 y.o. female seen today for diabetes management     Aletha Jane was diagnosed with diabetes at age 16   and currently on MDI  Lantus  44 units once a day, Novolog  SS    She has never worn a pump  Her son is a T2D on insulin, wears Tandem    The patient has been checking blood sugar   via Dexcom  G7  TIR  16%  Hyperglycemia 84%  Lows  0%  Avg BS  269  Gmi  9.7%     Most recent A1c results summarized below  Lab Results   Component Value Date/Time    LABA1C 9.2 05/29/2025 01:10 PM       Patient has had no hypoglycemic episodes     The patient hasn't been mindful of what has been eating and wasn't following diabetes diet    I reviewed current medications and the patient has no issues with diabetes medications  Aletha Jane is up to date with eye exam and denied any history of diabetic retinopathy   The patient is due for an eye exam. Last eye exam was   , no h/o diabetic retinopathy  The patient seeing podiatrist every   And also performs  own feet care  Microvascular complications:  No Retinopathy, Nephropathy or Neuropathy   Macrovascular complications: no CAD, PVD, or Stroke      PAST MEDICAL HISTORY   Past Medical History:   Diagnosis Date    Abnormal Pap smear 2014    trichomonas    Asthma     Diabetes mellitus (HCC)     Diabetic neuropathy (HCC)     Genital herpes     GERD (gastroesophageal reflux disease)        PAST SURGICAL HISTORY

## 2025-06-17 DIAGNOSIS — E11.65 POORLY CONTROLLED DIABETES MELLITUS (HCC): ICD-10-CM

## 2025-06-17 RX ORDER — INSULIN LISPRO 100 [IU]/ML
INJECTION, SOLUTION INTRAVENOUS; SUBCUTANEOUS
Qty: 30 ML | Refills: 5 | Status: SHIPPED | OUTPATIENT
Start: 2025-06-17